# Patient Record
Sex: FEMALE | Race: WHITE
[De-identification: names, ages, dates, MRNs, and addresses within clinical notes are randomized per-mention and may not be internally consistent; named-entity substitution may affect disease eponyms.]

---

## 2018-08-30 ENCOUNTER — HOSPITAL ENCOUNTER (OUTPATIENT)
Dept: HOSPITAL 89 - MAMO | Age: 38
End: 2018-08-30
Attending: NURSE PRACTITIONER
Payer: COMMERCIAL

## 2018-08-30 DIAGNOSIS — R92.8: ICD-10-CM

## 2018-08-30 DIAGNOSIS — Z12.31: Primary | ICD-10-CM

## 2018-08-30 PROCEDURE — 77067 SCR MAMMO BI INCL CAD: CPT

## 2018-08-30 PROCEDURE — 77063 BREAST TOMOSYNTHESIS BI: CPT

## 2018-08-31 NOTE — RADIOLOGY IMAGING REPORT
FACILITY: [facility] 

 

PATIENT NAME: MAX VILLARREAL

: [id number]

MR: [id number]

V: [id number]

EXAM DATE: [id number]

ORDERING PHYSICIAN: MARY ANN ESTEBAN

TECHNOLOGIST: Alyssa Moffett

 

PROCEDURE:BILATERAL DIGITAL SCREENING MAMMOGRAM WITH CAD ASSISTED 

INTERPRETATION & 3D TOMOSYNTHESIS 

 

COMPARISON:Prior mammograms 13.

 

INDICATIONS:SCREENING

 

FINDINGS:  

There is scattered fibroglandular tissue. New focal asymmetry in the 

Right outer breast approximately 8cm deep to the nipple based on CC 

view. This projects below the nipple line on the MLO view. This 

measures approximately 7-8mm. Otherwise no change compared to prior. No 

suspicious microcalcification.

 

DIAGNOSTIC CATEGORY 0--INCOMPLETE: NEED ADDITIONAL IMAGING EVALUATION.  

 

 

RECOMMENDATIONS:

ADDITIONAL MAMMOGRAPHIC VIEWS REQUIRED: RIGHT BREAST.    

 

IMPRESSION:

BIRADS 0: Incomplete. 

Needs additional imaging,

Spot compression Right breast CC and MLO views, straight lateral Right 

breast view and Right breast Ultrasound (if needed) recommended for 

further evaluation.

 

 

 

 

 

 

 

 

Dictated by:  Angel Perez on 2018 at 10:40   

Transcribed by: FIX on 2018 at 10:54    

Approved by:  Angel Perez on 2018 at 11:51   

[facility]

## 2018-10-11 ENCOUNTER — HOSPITAL ENCOUNTER (OUTPATIENT)
Dept: HOSPITAL 89 - MAMO | Age: 38
End: 2018-10-11
Attending: NURSE PRACTITIONER
Payer: COMMERCIAL

## 2018-10-11 DIAGNOSIS — R92.8: Primary | ICD-10-CM

## 2018-10-11 PROCEDURE — 77065 DX MAMMO INCL CAD UNI: CPT

## 2018-10-11 PROCEDURE — 77061 BREAST TOMOSYNTHESIS UNI: CPT

## 2018-10-12 NOTE — RADIOLOGY IMAGING REPORT
FACILITY: [facility] [place] 

 

PATIENT NAME: MAX VILLARREAL

: [id number]

MR: [id number]

V: [id number]

EXAM DATE: 

ORDERING PHYSICIAN: MARY ANN ESTEBAN

TECHNOLOGIST: Alyssa Moffett

 

PROCEDURE:RIGHT DIGITAL DIAGNOSTIC MAMMOGRAM WITH CAD ASSISTED 

INTERPRETATION & 3D TOMOSYNTHESIS 

 

COMPARISON:Prior Right mammogram 18, 13.

 

INDICATIONS:further evaluation

 

FINDINGS:

 The patient returns for a full field mediolateral view of the Right 

breast a full field Right rolled CC view and Spot compression view in 

the Right CC & MLO projections with 3D breast Tomosynthesis. 

The small focal area of increased density in the inferior lateral Right 

breast appeared compressible and apparently represented a summation 

shadow. There is no demonstration of malignant appearing mass or 

calcification in the Right breast.

  

DIAGNOSTIC CATEGORY 1--NEGATIVE.  

 

RECOMMENDATIONS:

ROUTINE MAMMOGRAM AND CLINICAL EVALUATION.   

 

IMPRESSION:

BIRADS 1: Negative. 

No significant abnormality is seen.

 

 

 

 

 

 

 

 

 

Dictated by:  Arielle Panchal M.D. on 10/11/2018 at 16:59   

Transcribed by: FIX on 10/12/2018 at 8:12    

Approved by:  Arielle Panchal M.D. on 10/12/2018 at 8:30   

[facility]